# Patient Record
Sex: MALE
[De-identification: names, ages, dates, MRNs, and addresses within clinical notes are randomized per-mention and may not be internally consistent; named-entity substitution may affect disease eponyms.]

---

## 2022-12-12 ENCOUNTER — APPOINTMENT (OUTPATIENT)
Dept: PHYSICAL MEDICINE AND REHAB | Facility: CLINIC | Age: 52
End: 2022-12-12

## 2023-02-10 PROBLEM — Z00.00 ENCOUNTER FOR PREVENTIVE HEALTH EXAMINATION: Status: ACTIVE | Noted: 2023-02-10

## 2023-03-01 ENCOUNTER — NON-APPOINTMENT (OUTPATIENT)
Age: 53
End: 2023-03-01

## 2023-03-29 ENCOUNTER — APPOINTMENT (OUTPATIENT)
Dept: PHYSICAL MEDICINE AND REHAB | Facility: CLINIC | Age: 53
End: 2023-03-29
Payer: OTHER MISCELLANEOUS

## 2023-03-29 VITALS
SYSTOLIC BLOOD PRESSURE: 153 MMHG | DIASTOLIC BLOOD PRESSURE: 81 MMHG | BODY MASS INDEX: 34.65 KG/M2 | HEART RATE: 79 BPM | HEIGHT: 70 IN | WEIGHT: 242 LBS | RESPIRATION RATE: 18 BRPM

## 2023-03-29 DIAGNOSIS — Z78.9 OTHER SPECIFIED HEALTH STATUS: ICD-10-CM

## 2023-03-29 DIAGNOSIS — R20.0 ANESTHESIA OF SKIN: ICD-10-CM

## 2023-03-29 DIAGNOSIS — G89.29 PAIN IN RIGHT HAND: ICD-10-CM

## 2023-03-29 DIAGNOSIS — S66.919A STRAIN OF UNSPECIFIED MUSCLE, FASCIA AND TENDON AT WRIST AND HAND LEVEL, UNSPECIFIED HAND, INITIAL ENCOUNTER: ICD-10-CM
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
## 2023-03-29 DIAGNOSIS — R20.2 ANESTHESIA OF SKIN: ICD-10-CM

## 2023-03-29 DIAGNOSIS — Z86.39 PERSONAL HISTORY OF OTHER ENDOCRINE, NUTRITIONAL AND METABOLIC DISEASE: ICD-10-CM

## 2023-03-29 DIAGNOSIS — M79.641 PAIN IN RIGHT HAND: ICD-10-CM
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
## 2023-03-29 PROCEDURE — 99205 OFFICE O/P NEW HI 60 MIN: CPT

## 2023-03-29 NOTE — PHYSICAL EXAM
[FreeTextEntry1] : PHYSICAL EXAM : OBJECTIVE \par \par GENERAL : Awake ,alert and oriented to time place and person \par HEAD : normocephalic and atraumatic \par NECK : supple ,no tracheal deviation ,no thyroid enlargement noted with swallowing\par EYES : sclera and conjunctiva normal no redness,intact extraocular movements \par ENT  : ears and nose normal in appearance -hearing adequate \par PULMONARY: effort normal. No respiratory distress. breathing regular. No wheezes \par LYMPH : No swelling in limbs, capillary return within normal range \par CVS : warm extremities,no palpitations,not short of breath, no visible jugular venous distention\par PSYCH : mood and affect normal ,good eye contact ,normal attention \par ABDOMEN : no visible distension , \par NEUROLOGICAL:cranial nerves intact,muscle tone normal,gait and balance safe except where noted below \par SKIN : warm and dry No rash detected over specific body areas examined \par MUSCULOSKELETAL: normal muscle bulk, no focal bony tenderness /posture normal except where specified below\par c spine ROM intact \par No long tract signs found on clinical exam and no focal neurological deficits reflexes 2 + thru out \par gait NL \par + tinels R neg phalens \par no sensation loss \par no wasting \par MMT and  5/5 \par no redness no swelling

## 2023-03-29 NOTE — ASSESSMENT
[Indicate if, in your opinion, the incident that the patient described was the competent medical cause of this injury/illness.] : The incident that the patient described was the competent medical cause of this injury/illness: Yes [Indicate if the patient's complaints are consistent with his/her history of the injury/illness.] : Indicate if the patient's complaints are consistent with his/her history of the injury/illness: Yes [Yes] : Yes, it is consistent [Can the patient return to usual work activities as indicated? If yes, indicate date___] : The patient cannot return to usual work activities as indicated. [FreeTextEntry1] : see list  [FreeTextEntry5] : 100 [FreeTextEntry6] : R HD -he cannot use his R hand and arm safely  [Physical Disability Temporary Total] : temporarily total disabled

## 2023-03-29 NOTE — REASON FOR VISIT
[Workers' Comp: Date of Injury: _______] : This visit is related to worker's compensation. Date of Injury: [unfilled] [Spouse] : spouse [FreeTextEntry1] : ref by Dr BARNARD

## 2023-03-29 NOTE — CONSULT LETTER
[FreeTextEntry1] : Dear Dr. BARNARD  , \par \par I had the pleasure of evaluating your patient, KEE CIFUENTES .\par \par Thank you very much for allowing me to participate in the care of this patient. If you have any questions, please do not hesitate to contact me. \par \par Sincerely, \par Tee Graham MD \par \par ABPMR Board Certified in Physical Medicine and Rehabilitation\par Certified Fellow of AANEM (Neuromuscular and Electrodiagnostic Medicine)\par Subspecialty certified in Sports Medicine (ABPMR)\par \par  of Physical Medicine and Rehabilitation\par [employer] [facility]\par [facility]\par \par

## 2023-03-29 NOTE — HISTORY OF PRESENT ILLNESS
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 [No] : The patient is currently not working. [FreeTextEntry6] : 11/17/2022